# Patient Record
Sex: FEMALE | Race: WHITE | URBAN - METROPOLITAN AREA
[De-identification: names, ages, dates, MRNs, and addresses within clinical notes are randomized per-mention and may not be internally consistent; named-entity substitution may affect disease eponyms.]

---

## 2018-01-10 ENCOUNTER — OFFICE VISIT (OUTPATIENT)
Dept: ORTHOPEDIC SURGERY | Age: 53
End: 2018-01-10

## 2018-01-10 VITALS
SYSTOLIC BLOOD PRESSURE: 127 MMHG | HEIGHT: 63 IN | WEIGHT: 125 LBS | BODY MASS INDEX: 22.15 KG/M2 | DIASTOLIC BLOOD PRESSURE: 75 MMHG | HEART RATE: 104 BPM

## 2018-01-10 DIAGNOSIS — M17.11 OSTEOARTHRITIS OF RIGHT KNEE, UNSPECIFIED OSTEOARTHRITIS TYPE: ICD-10-CM

## 2018-01-10 DIAGNOSIS — S83.91XA SPRAIN OF RIGHT KNEE, UNSPECIFIED LIGAMENT, INITIAL ENCOUNTER: ICD-10-CM

## 2018-01-10 DIAGNOSIS — M25.561 RIGHT KNEE PAIN, UNSPECIFIED CHRONICITY: Primary | ICD-10-CM

## 2018-01-10 PROCEDURE — 99243 OFF/OP CNSLTJ NEW/EST LOW 30: CPT | Performed by: NURSE PRACTITIONER

## 2018-01-10 NOTE — PROGRESS NOTES
Darnell Yo  [id number]  January 10, 2018    Chief Complaint   Patient presents with    Knee Pain     RT knee pain       History: This is a 46 y.o. female here for evaluation regarding her right knee(s). The patient reportedly slipped on some ice and landed on her flexed knee on 1/8/18. She then drove her son to school and did not notice much pain or stiffness until she attempted to get out of the car. She then presented to the ER and had x-rays taken and was placed in a knee immobilizer and given crutches. She has been taking ibuprofen and Norco since that time. She reports no real pain when she is wearing the brace but she does have some discomfort when she removes the brace to shower or to sleep. She does note some stiffness in the knee. She denies any prior knee injuries. This is a consult for right knee pain from Og Page CNP. Vitals:  /75   Pulse 104   Ht 5' 2.99\" (1.6 m)   Wt 125 lb (56.7 kg)   BMI 22.15 kg/m²     The patient's  past medical history, medications, allergies,  family history, social history, and have been reviewed, and dated and are recorded in the chart. Pertinent items are noted in HPI. Review of systems reviewed from Patient History For dated on 1/10/18 and available in the patient's chart under the Media tab. Physical: Ms. Darnell Yo appears well, she is in no apparent distress, she demonstrates appropriate mood & affect. She is alert and oriented to person, place and time. Examination of the right lower extremity reveals no pain with range of motion of the hip. She has mild generalized tenderness to palpation about the lateral joint line of the right knee. No ecchymosis is noted. Range of motion is from 0 degrees to 95 degrees. Strength is 4+ to 5/5 for all muscle groups about the right knee. There is patellofemoral crepitus with range of motion of the right knee. Varus and valgus stressing of the knees reveals no evidence of instability.  There is no

## 2018-01-12 ENCOUNTER — TELEPHONE (OUTPATIENT)
Dept: ORTHOPEDIC SURGERY | Age: 53
End: 2018-01-12

## 2018-01-12 RX ORDER — MELOXICAM 7.5 MG/1
7.5 TABLET ORAL DAILY
Qty: 15 TABLET | Refills: 0 | Status: SHIPPED | OUTPATIENT
Start: 2018-01-12

## 2018-01-12 NOTE — TELEPHONE ENCOUNTER
Pt was seen on Wednesday and told meloxicam was supposed to be sent to her pharmacy but they don't have a script   pls call pt thanks